# Patient Record
Sex: FEMALE | Race: WHITE
[De-identification: names, ages, dates, MRNs, and addresses within clinical notes are randomized per-mention and may not be internally consistent; named-entity substitution may affect disease eponyms.]

---

## 2019-10-06 ENCOUNTER — HOSPITAL ENCOUNTER (EMERGENCY)
Dept: HOSPITAL 25 - ED | Age: 24
LOS: 1 days | Discharge: HOME | End: 2019-10-07
Payer: COMMERCIAL

## 2019-10-06 DIAGNOSIS — Z72.0: ICD-10-CM
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
## 2019-10-06 DIAGNOSIS — S61.412A: Primary | ICD-10-CM
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
## 2019-10-06 PROCEDURE — 12002 RPR S/N/AX/GEN/TRNK2.6-7.5CM: CPT

## 2019-10-06 PROCEDURE — 99282 EMERGENCY DEPT VISIT SF MDM: CPT

## 2019-10-06 PROCEDURE — 90715 TDAP VACCINE 7 YRS/> IM: CPT

## 2019-10-06 PROCEDURE — 90471 IMMUNIZATION ADMIN: CPT

## 2019-10-06 NOTE — ED
Laceration/Wound HPI





- HPI Summary


HPI Summary: 


This patient is a 24 year old F presenting to ED with a chief complaint of L 

hand laceration since 2100 today. Patient was at work polishing a wine glass. 

The wine glass broke and caused the laceration. The patient rates the pain 2/10 

in severity. Symptoms aggravated by nothing. Symptoms alleviated by nothing. 

Patient denies fever.





- History of Current Complaint


Stated Complaint: HAND LAC PER PT


Time Seen by Provider: 10/06/19 22:19


Hx Obtained From: Patient


Mechanism of Injury: Sharp/Blunt Trauma


Onset/Duration: Sudden Onset, Lasting Hours - At 2100, Still Present


Aggravating: Nothing


Alleviating: Nothing


Timing: Constant


Onset Severity: Mild


Current Severity: Mild


Pain Intensity: 2


Pain Scale Used: 0-10 Numeric


Associated Signs & Symptoms: Negative - Fever





- Allergy/Home Medications


Allergies/Adverse Reactions: 


 Allergies











Allergy/AdvReac Type Severity Reaction Status Date / Time


 


No Known Allergies Allergy   Verified 10/06/19 21:29














PMH/Surg Hx/FS Hx/Imm Hx


Endocrine/Hematology History: 


   Denies: Hx Diabetes


Cardiovascular History: 


   Denies: Hx Hypercholesterolemia, Hx Hypertension


Respiratory History: Reports: Hx Asthma





- Surgical History


Surgery Procedure, Year, and Place: None


Infectious Disease History: No


Infectious Disease History: 


   Denies: Traveled Outside the [place] in Last 30 Days





- Family History


Known Family History: 


   Negative: Hypertension, Diabetes





- Social History


Alcohol Use: Occasionally


Hx Substance Use: Yes


Substance Use Type: Reports: Marijuana


Hx Tobacco Use: Yes


Smoking Status (MU): Current Some Day Smoker





Review of Systems


Negative: Fever


Skin: Other - Laceration to L hand


All Other Systems Reviewed And Are Negative: Yes





Physical Exam





- Summary


Physical Exam Summary: 


Appearance: Well-appearing, Well-nourished, lying in bed comfortable


Skin: Warm, dry, no obvious rash


Eyes: sclera anicteric, no conjunctival pallor


ENT: mucous membranes moist


Neck: deferred


Respiratory: No signs of respiratory distress


Cardiovascular: Appears well perfused, pulses are nml


Abdomen: deferred


Musculoskeletal: 2cm laceration overlying the radial side of the first MCP 

joint. There is what appears to be a visible cut tendon end on the distal side.


Neurological: Awake and alert, mentation is normal, speech is fluent and 

appropriate


Psychiatric: affect is normal, does not appear anxious or depressed


Triage Information Reviewed: Yes


Vital Signs On Initial Exam: 


 Initial Vitals











Temp Pulse Resp BP Pulse Ox


 


 98.4 F   56   15   145/87   100 


 


 10/06/19 21:27  10/06/19 21:27  10/06/19 21:27  10/06/19 21:27  10/06/19 21:27











Vital Signs Reviewed: Yes





Procedures





- Sedation


Patient Received Moderate/Deep Sedation with Procedure: No





- Splinting


  ** 1


Location: left thumb


Hand-Made Type: orthoglass


Splint: thumb spica


Pre-Proc Neuro Vasc Exam: normal


Post-Proc Neuro Vasc Exam: normal


Splint Applied by Provider: Tommie Daniel





- Laceration/Wound Repair


  ** 1


Location: upper extremity - left lateral thumb


Description: Linear


Anesthesia: Local, 1.0%


Length, Depth and Shape: 3cm x 1cm


Betadine Prep?: No - chlorhexidine


Irrigated w/ Saline (ccs): 400


Laceration/Wound Explored: clean


Suture Type: Prolene


Number of Sutures: 7 - 4.0


Layer Closure?: No


Sterile Dressing Applied?: No





Diagnostics





- Vital Signs


 Vital Signs











  Temp Pulse Resp BP Pulse Ox


 


 10/06/19 21:27  98.4 F  56  15  145/87  100














- Laboratory


Lab Statement: Any lab studies that have been ordered have been reviewed, and 

results considered in the medical decision making process.





Re-Evaluation





- Re-Evaluation


  ** First Eval


Re-Evaluation Time: 23:09


Comment: Discussed results with patient and instructions to follow-up with hand 

clinic. SANTOS Talavera, will suture the wound.





Laceration Repair Course/Dx





- Course


Course Of Treatment: This patient is a 24 year old F presenting to ED with a 

chief complaint of L hand laceration since 2100 today. The wound was cleaned, 

closed, and splinted by SANTOS Talavera. Patient will follow-up with hand 

clinic. Patient will be discharged home with dx of left hand laceration. 

Patient understands and agrees with this plan.





- Clinical Impression


Provider Diagnoses: 


 Laceration








- Physician Notifications


Discussed Care Of Patient With: Vincent Kennedy


Time Discussed With Above Provider: 22:38


Instructed by Provider To: Have Pt Call For Appt. - Discussed patient case with 

Dr. Kennedy, who recommended washing, cleaning, and closing the wound. He will 

see the patient in the hand clinic.





Discharge ED





- Sign-Out/Discharge


Documenting (check all that apply): Patient Departure





- Discharge Plan


Condition: Stable


Disposition: HOME


Patient Education Materials:  Care For Your Stitches (ED), Finger Laceration (ED

)


Referrals: 


John Paul Barragan MD [Primary Care Provider] - 


Vincent Kennedy MD [Medical Doctor] - 


Additional Instructions: 


Sutures come out in 10 days.  May wash wound with warm running water and soap.  

Keep clean and dry and protected when not washing.  Wear splint until you are 

evaluated by orthopedics Dr. Kennedy.  Return to the ED for any concerning 

symptoms.





- Billing Disposition and Condition


Condition: STABLE


Disposition: Home





- Attestation Statements


Document Initiated by Elías: Yes


Documenting Scribe: Seymour Reese


Provider For Whom Elías is Documenting (Include Credential): Wolf Yap MD


Scribe Attestation: 


ISeymour, scribed for Wolf Yap MD on 10/14/19 at 0514. 


Scribe Documentation Reviewed: Yes


Provider Attestation: 


The documentation as recorded by the Seymour youngblood accurately reflects 

the service I personally performed and the decisions made by me, Wolf Yap MD


Status of Scribe Document: Viewed

## 2019-10-07 VITALS — DIASTOLIC BLOOD PRESSURE: 68 MMHG | SYSTOLIC BLOOD PRESSURE: 144 MMHG

## 2019-10-24 ENCOUNTER — HOSPITAL ENCOUNTER (EMERGENCY)
Dept: HOSPITAL 25 - UCEAST | Age: 24
Discharge: HOME | End: 2019-10-24
Payer: COMMERCIAL

## 2019-10-24 VITALS — DIASTOLIC BLOOD PRESSURE: 89 MMHG | SYSTOLIC BLOOD PRESSURE: 133 MMHG

## 2019-10-24 DIAGNOSIS — R31.9: ICD-10-CM

## 2019-10-24 DIAGNOSIS — N39.0: Primary | ICD-10-CM

## 2019-10-24 PROCEDURE — G0463 HOSPITAL OUTPT CLINIC VISIT: HCPCS

## 2019-10-24 PROCEDURE — 87077 CULTURE AEROBIC IDENTIFY: CPT

## 2019-10-24 PROCEDURE — 84702 CHORIONIC GONADOTROPIN TEST: CPT

## 2019-10-24 PROCEDURE — 99212 OFFICE O/P EST SF 10 MIN: CPT

## 2019-10-24 PROCEDURE — 87086 URINE CULTURE/COLONY COUNT: CPT

## 2019-10-24 PROCEDURE — 81003 URINALYSIS AUTO W/O SCOPE: CPT

## 2019-10-24 NOTE — XMS REPORT
Continuity of Care Document (CCD)

 Created on:2019



Patient:Tara Lowe

Sex:Female

:1995

External Reference #:[id number]





Demographics







 Address  [address] [address]

 

 Mobile Phone  [phone]

 

 Email Address  [email or web address]

 

 Preferred Language  en

 

 Marital Status  Not  or 

 

 Latter-day Affiliation  Unknown

 

 Race  White

 

 Ethnic Group  Not  or 









Author







 Name  Margie Donnelly M.D. (transmitted by agent of provider Christiano Bell)

 

 Address  [address] [place] [address]



   [place]



   [place] [address]









Care Team Providers







 Name  Role  Phone

 

 Patient's Choice  Care Team Information   Unavailable









Problems







 Description

 

 No Information Available







Social History







 Type  Date  Description  Comments

 

 Birth Sex    Unknown  

 

 ETOH Use    Occasionally consumes alcohol  

 

 Tobacco Use  Start: Unknown  Patient is a current smoker,  



     smokes some days  

 

 Smoking Status  Reviewed: 10/16/19  Patient is a current smoker,  



     smokes some days  

 

 Exercise Type/Frequency    Exercises sporadically  







Allergies, Adverse Reactions, Alerts







 Description

 

 No Known Drug Allergies







Medications







 Description

 

 No Active Medications







Immunizations







 Description

 

 No Information Available







Vital Signs







 Date  Vital  Result  Comment

 

 10/16/2019 10:38am  Height  70 inches  5'10"









 Weight  125.00 lb  

 

 BP Systolic  112 mmHg  

 

 BP Diastolic  58 mmHg  

 

 Body Temperature  97.8 F  

 

 BMI (Body Mass Index)  17.9 kg/m2  









 10/07/2019  2:32pm  Height  70.25 inches  5'10.25"









 Weight  125.00 lb  

 

 Heart Rate  46 /min  

 

 BP Systolic  128 mmHg  

 

 BP Diastolic  76 mmHg  

 

 BMI (Body Mass Index)  17.8 kg/m2  







Results







 Description

 

 No Information Available







Procedures







 Description

 

 No Information Available







Medical Devices







 Description

 

 No Information Available







Encounters







 Type  Date  Location  Provider  Dx  Diagnosis

 

 Office Visit  10/07/2019  [facility]  Margie Donnelly,  S6Duncan.012A  
Laceration w/o fb



   1:45p  at [facility]    of left thumb w/o



           damage to nail,



           init







Assessments







 Date  Code  Description  Provider

 

 10/16/2019  SERGIO1.012D  Laceration without foreign body of left  Margie Donnelly M.D.



     thumb without damage to nail, subsequent  



     encounter  

 

 10/07/2019  SERGIO1.012A  Laceration without foreign body of left  Margie Donnelly
, M.D.



     thumb without damage to nail, initial  



     encounter  







Plan of Treatment

10/16/2019 - Margie Donnelly M.D.S61.012D Laceration without foreign body of 
left thumb without damage to nail, subsequent encounterFollow up:Follow up:   
As needed



Functional Status







 Description

 

 No Information Available







Mental Status







 Description

 

 No Information Available







Referrals







 Description

 

 No Information Available

## 2019-10-24 NOTE — XMS REPORT
Continuity of Care Document (CCD)

 Created on:2019



Patient:Tara Lowe

Sex:Female

:1995

External Reference #:[id number]





Demographics







 Address  [address] [address]

 

 Mobile Phone  [phone]

 

 Email Address  [email or web address]

 

 Preferred Language  en

 

 Marital Status  Not  or 

 

 Baptism Affiliation  Unknown

 

 Race  White

 

 Ethnic Group  Not  or 









Author







 Name  Margie Donnelly M.D. (transmitted by agent of provider Tanvi Martinez)

 

 Address  [address] [place] [id number]









Care Team Providers







 Name  Role  Phone

 

 Patient's Choice  Care Team Information   Unavailable









Problems







 Description

 

 No Information Available







Social History







 Type  Date  Description  Comments

 

 Birth Sex    Unknown  

 

 ETOH Use    Occasionally consumes alcohol  

 

 Tobacco Use  Start: Unknown  Patient is a current smoker,  



     smokes some days  

 

 Smoking Status  Reviewed: 10/07/19  Patient is a current smoker,  



     smokes some days  

 

 Exercise Type/Frequency    Exercises sporadically  







Allergies, Adverse Reactions, Alerts







 Description

 

 No Known Drug Allergies







Medications







 Description

 

 No Active Medications







Immunizations







 Description

 

 No Information Available







Vital Signs







 Date  Vital  Result  Comment

 

 10/07/2019  2:32pm  Height  70.25 inches  5'10.25"









 Weight  125.00 lb  

 

 Heart Rate  46 /min  

 

 BP Systolic  128 mmHg  

 

 BP Diastolic  76 mmHg  

 

 BMI (Body Mass Index)  17.8 kg/m2  







Results







 Description

 

 No Information Available







Procedures







 Description

 

 No Information Available







Medical Devices







 Description

 

 No Information Available







Encounters







 Description

 

 No Information Available







Assessments







 Date  Code  Description  Provider

 

 10/07/2019  S61.012A  Laceration without foreign body of left  Margie Donnelly M.D.



     thumb without damage to nail, initial  



     encounter  







Plan of Treatment

Future Appointment(s):10/16/2019 10:30 am - Margie Donnelly M.D. at [facility] at [facility]11/2019 - Margie Donnelly M.D.S61.012A Laceration 
without foreign body of left thumb without damage to nail, initial 
encounterFollow up:Follow up:   next Wed or Th



Functional Status







 Description

 

 No Information Available







Mental Status







 Description

 

 No Information Available







Referrals







 Description

 

 No Information Available